# Patient Record
Sex: FEMALE | Race: BLACK OR AFRICAN AMERICAN | Employment: FULL TIME | URBAN - METROPOLITAN AREA
[De-identification: names, ages, dates, MRNs, and addresses within clinical notes are randomized per-mention and may not be internally consistent; named-entity substitution may affect disease eponyms.]

---

## 2017-10-27 ENCOUNTER — APPOINTMENT (OUTPATIENT)
Dept: ULTRASOUND IMAGING | Age: 22
End: 2017-10-27
Attending: EMERGENCY MEDICINE
Payer: COMMERCIAL

## 2017-10-27 ENCOUNTER — HOSPITAL ENCOUNTER (EMERGENCY)
Age: 22
Discharge: HOME | End: 2017-10-27
Attending: EMERGENCY MEDICINE
Payer: COMMERCIAL

## 2017-10-27 VITALS
OXYGEN SATURATION: 98 % | RESPIRATION RATE: 16 BRPM | SYSTOLIC BLOOD PRESSURE: 119 MMHG | HEART RATE: 70 BPM | DIASTOLIC BLOOD PRESSURE: 76 MMHG | WEIGHT: 200 LBS | HEIGHT: 66 IN | TEMPERATURE: 98.6 F | BODY MASS INDEX: 32.14 KG/M2

## 2017-10-27 DIAGNOSIS — R10.31 ABDOMINAL PAIN, RIGHT LOWER QUADRANT: Primary | ICD-10-CM

## 2017-10-27 LAB
ALBUMIN SERPL-MCNC: 3.9 G/DL (ref 3.5–5)
ALBUMIN/GLOB SERPL: 0.9 {RATIO} (ref 1.2–3.5)
ALP SERPL-CCNC: 55 U/L (ref 50–136)
ALT SERPL-CCNC: 27 U/L (ref 12–65)
ANION GAP SERPL CALC-SCNC: 8 MMOL/L (ref 7–16)
AST SERPL-CCNC: 23 U/L (ref 15–37)
BASOPHILS # BLD: 0 K/UL (ref 0–0.2)
BASOPHILS NFR BLD: 0 % (ref 0–2)
BILIRUB SERPL-MCNC: 0.2 MG/DL (ref 0.2–1.1)
BUN SERPL-MCNC: 10 MG/DL (ref 6–23)
CALCIUM SERPL-MCNC: 9 MG/DL (ref 8.3–10.4)
CHLORIDE SERPL-SCNC: 103 MMOL/L (ref 98–107)
CO2 SERPL-SCNC: 29 MMOL/L (ref 21–32)
CREAT SERPL-MCNC: 1.03 MG/DL (ref 0.6–1)
DIFFERENTIAL METHOD BLD: ABNORMAL
EOSINOPHIL # BLD: 0.4 K/UL (ref 0–0.8)
EOSINOPHIL NFR BLD: 5 % (ref 0.5–7.8)
ERYTHROCYTE [DISTWIDTH] IN BLOOD BY AUTOMATED COUNT: 14.1 % (ref 11.9–14.6)
GLOBULIN SER CALC-MCNC: 4.4 G/DL (ref 2.3–3.5)
GLUCOSE SERPL-MCNC: 95 MG/DL (ref 65–100)
HCG UR QL: NEGATIVE
HCT VFR BLD AUTO: 40 % (ref 35.8–46.3)
HGB BLD-MCNC: 13.2 G/DL (ref 11.7–15.4)
IMM GRANULOCYTES # BLD: 0 K/UL (ref 0–0.5)
IMM GRANULOCYTES NFR BLD: 0 % (ref 0–5)
LYMPHOCYTES # BLD: 3.1 K/UL (ref 0.5–4.6)
LYMPHOCYTES NFR BLD: 35 % (ref 13–44)
MCH RBC QN AUTO: 26.7 PG (ref 26.1–32.9)
MCHC RBC AUTO-ENTMCNC: 33 G/DL (ref 31.4–35)
MCV RBC AUTO: 81 FL (ref 79.6–97.8)
MONOCYTES # BLD: 0.6 K/UL (ref 0.1–1.3)
MONOCYTES NFR BLD: 7 % (ref 4–12)
NEUTS SEG # BLD: 4.6 K/UL (ref 1.7–8.2)
NEUTS SEG NFR BLD: 53 % (ref 43–78)
PLATELET # BLD AUTO: 370 K/UL (ref 150–450)
PMV BLD AUTO: 9.5 FL (ref 10.8–14.1)
POTASSIUM SERPL-SCNC: 3.8 MMOL/L (ref 3.5–5.1)
PROT SERPL-MCNC: 8.3 G/DL (ref 6.3–8.2)
RBC # BLD AUTO: 4.94 M/UL (ref 4.05–5.25)
SERVICE CMNT-IMP: NORMAL
SODIUM SERPL-SCNC: 140 MMOL/L (ref 136–145)
WBC # BLD AUTO: 8.8 K/UL (ref 4.5–13.5)
WET PREP GENITAL: NORMAL

## 2017-10-27 PROCEDURE — 96361 HYDRATE IV INFUSION ADD-ON: CPT | Performed by: EMERGENCY MEDICINE

## 2017-10-27 PROCEDURE — 81003 URINALYSIS AUTO W/O SCOPE: CPT | Performed by: EMERGENCY MEDICINE

## 2017-10-27 PROCEDURE — 76856 US EXAM PELVIC COMPLETE: CPT

## 2017-10-27 PROCEDURE — 81025 URINE PREGNANCY TEST: CPT

## 2017-10-27 PROCEDURE — 80053 COMPREHEN METABOLIC PANEL: CPT | Performed by: EMERGENCY MEDICINE

## 2017-10-27 PROCEDURE — 96375 TX/PRO/DX INJ NEW DRUG ADDON: CPT | Performed by: EMERGENCY MEDICINE

## 2017-10-27 PROCEDURE — 74011250636 HC RX REV CODE- 250/636: Performed by: EMERGENCY MEDICINE

## 2017-10-27 PROCEDURE — 87210 SMEAR WET MOUNT SALINE/INK: CPT | Performed by: EMERGENCY MEDICINE

## 2017-10-27 PROCEDURE — 85025 COMPLETE CBC W/AUTO DIFF WBC: CPT | Performed by: EMERGENCY MEDICINE

## 2017-10-27 PROCEDURE — 96374 THER/PROPH/DIAG INJ IV PUSH: CPT | Performed by: EMERGENCY MEDICINE

## 2017-10-27 PROCEDURE — 99285 EMERGENCY DEPT VISIT HI MDM: CPT | Performed by: EMERGENCY MEDICINE

## 2017-10-27 RX ORDER — BUPROPION HYDROCHLORIDE 150 MG/1
150 TABLET ORAL
COMMUNITY

## 2017-10-27 RX ORDER — ONDANSETRON 2 MG/ML
4 INJECTION INTRAMUSCULAR; INTRAVENOUS
Status: COMPLETED | OUTPATIENT
Start: 2017-10-27 | End: 2017-10-27

## 2017-10-27 RX ORDER — MORPHINE SULFATE 10 MG/ML
5 INJECTION, SOLUTION INTRAMUSCULAR; INTRAVENOUS ONCE
Status: COMPLETED | OUTPATIENT
Start: 2017-10-27 | End: 2017-10-27

## 2017-10-27 RX ORDER — METRONIDAZOLE 7.5 MG/G
1 GEL VAGINAL
Qty: 187.5 MG | Refills: 0 | Status: SHIPPED | OUTPATIENT
Start: 2017-10-27 | End: 2017-11-01

## 2017-10-27 RX ORDER — TRAMADOL HYDROCHLORIDE 50 MG/1
50 TABLET ORAL
Qty: 12 TAB | Refills: 0 | Status: SHIPPED | OUTPATIENT
Start: 2017-10-27 | End: 2017-11-08

## 2017-10-27 RX ADMIN — ONDANSETRON 4 MG: 2 INJECTION INTRAMUSCULAR; INTRAVENOUS at 18:06

## 2017-10-27 RX ADMIN — SODIUM CHLORIDE 1000 ML: 900 INJECTION, SOLUTION INTRAVENOUS at 18:06

## 2017-10-27 RX ADMIN — MORPHINE SULFATE 5 MG: 10 INJECTION INTRAMUSCULAR; INTRAVENOUS; SUBCUTANEOUS at 18:06

## 2017-10-27 NOTE — ED PROVIDER NOTES
HPI:  23 F, history of right ovarian cystocele with pain in the right lower quadrant with abnormal vaginal bleeding that is heavy for the past 4 hours. Similar to previous ovarian cyst pain. Recently moved here 2-1/2 months ago from [place]. Since then has had 3 episode abnormal vaginal bleeding. Has not been sexually active for over one year. Currently on Sprintec. Has an appointment with [facility]. Call them about her heavy vaginal bleeding today and was told to come to the emergency room for evaluation. No Fever. Positive nausea. No vomiting. No urinary symptoms. No weakness tingling or numbness. ROS  Constitutional: No fever, no chills  Skin: no rash  Eye: No vision changes  ENMT: No sore throat, no congestion  Respiratory: No shortness of breath, no cough  Cardiovascular: No chest pain, no palpitations  Gastrointestinal: No vomiting, + nausea, no diarrhea, no constipation, no rectal bleeding, + abdominal pain  : No dysuria, no hematuria  MSK: No back pain, no muscle pain, no joint pain  Neuro: No headache, no change in mental status, no numbness, no tingling, no weakness  Psych: No anxiety, no depression  Endocrine: No hyperglycemia  All other review of systems positive per history of present illness and the above otherwise negative or noncontributory. Visit Vitals    /83 (BP 1 Location: Left arm, BP Patient Position: Sitting)    Pulse 80    Temp 97.9 °F (36.6 °C)    Resp 16    Ht 5' 6\" (1.676 m)    Wt 90.7 kg (200 lb)    LMP 10/17/2017    SpO2 100%    BMI 32.28 kg/m2     Past Medical History:   Diagnosis Date    Depression     Fx     Fx both wrist x6 times, Right ankle x2, Ribs- Patient was a .  Ovarian cyst     Right and left ovary     History reviewed. No pertinent surgical history. Prior to Admission Medications   Prescriptions Last Dose Informant Patient Reported? Taking?    albuterol (PROVENTIL HFA, VENTOLIN HFA, PROAIR HFA) 90 mcg/actuation inhaler   Yes No   Sig: Take 2 Puffs by inhalation. buPROPion XL (WELLBUTRIN XL) 150 mg tablet   Yes Yes   Sig: Take 150 mg by mouth every morning. loratadine (CLARITIN) 10 mg tablet   Yes No   Sig: Take 10 mg by mouth. norgestimate-ethinyl estradiol (SPRINTEC, 28,) 0.25-35 mg-mcg tab   No No   Sig: Take 1 Tab by mouth daily. Facility-Administered Medications: None         Adult Exam   General: alert, no acute distress  Head: normocephalic, atraumatic  ENT: moist mucous membranes  Neck: supple, non-tender; full range of motion  Cardiovascular: regular rate and rhythm, normal peripheral perfusion, no edema  Respiratory: lungs are clear to auscultation; normal respirations; no wheezing, rales or rhonchi  Gastrointestinal: soft, +RLQ pain; no rebound or guarding, no peritoneal signs, no distension  : dry blood noted externally without any obvious lesion. Small amount of blood noted within the vaginal vault. Dark red. No cervical motion tenderness. Left adnexa without pain or mass. Right adnexal mass with tenderness to palpation. Back: non-tender, full range of motion  Musculoskeletal: normal range of motion, normal strength, no gross deformities  Neurological: alert and oriented x 4, no gross focal deficits; normal speech  Psychiatric: cooperative; appropriate mood and affect    MDM:inserted for dysfunctional uterine bleed, ruptured ovarian cyst.  She does not have any signs of peritonitis at this time. We'll obtain ultrasound for evaluation of ovarian cyst, torsion. Lower suspicion for acute appendicitis at this time. Us Pelv Non Ob W Tv    Result Date: 10/27/2017  Pelvic and transvaginal ultrasound History right lower quadrant pelvic pain x7 hours. Dysfunctional uterine bleeding. Real-time pelvic and transvaginal ultrasounds performed. No prior studies are available for comparison. FINDINGS: The uterus measures approximately 5.4 x 2.5 x 3.0 cm.  The endometrial complex was unable to be visualized. The right ovary was measured at approximately 4.0 x 1.4 x 3.0 cm. The left ovary was measured at approximately 3.2 x 1.8 x 2.0 cm. Transvaginal imaging is recommended to further assess the endometrial complex and adnexa. Transvaginal ultrasound: The uterus is homogeneous without discrete mass. The individual complex measures 2 mm. There is a trace amount of free fluid within the endocervical canal. The right ovary was remeasured at approximately 3.4 x 1.8 x 1.9 cm containing several small follicles. The left ovary was remeasured at approximately 2.9 x 2.3 x 2.5 cm also containing several small follicles. Color flow is present within both ovaries. There is no free fluid within the pelvis. IMPRESSION: 1. Trace amount of fluid within the endocervical canal is nonspecific. Correlation with menstrual cycle is recommended. 2. Otherwise unremarkable pelvic and transvaginal ultrasound. Recent Results (from the past 8 hour(s))   CBC WITH AUTOMATED DIFF    Collection Time: 10/27/17  5:20 PM   Result Value Ref Range    WBC 8.8 4.5 - 13.5 K/uL    RBC 4.94 4.05 - 5.25 M/uL    HGB 13.2 11.7 - 15.4 g/dL    HCT 40.0 35.8 - 46.3 %    MCV 81.0 79.6 - 97.8 FL    MCH 26.7 26.1 - 32.9 PG    MCHC 33.0 31.4 - 35.0 g/dL    RDW 14.1 11.9 - 14.6 %    PLATELET 727 851 - 288 K/uL    MPV 9.5 (L) 10.8 - 14.1 FL    DF AUTOMATED      NEUTROPHILS 53 43 - 78 %    LYMPHOCYTES 35 13 - 44 %    MONOCYTES 7 4.0 - 12.0 %    EOSINOPHILS 5 0.5 - 7.8 %    BASOPHILS 0 0.0 - 2.0 %    IMMATURE GRANULOCYTES 0 0.0 - 5.0 %    ABS. NEUTROPHILS 4.6 1.7 - 8.2 K/UL    ABS. LYMPHOCYTES 3.1 0.5 - 4.6 K/UL    ABS. MONOCYTES 0.6 0.1 - 1.3 K/UL    ABS. EOSINOPHILS 0.4 0.0 - 0.8 K/UL    ABS. BASOPHILS 0.0 0.0 - 0.2 K/UL    ABS. IMM.  GRANS. 0.0 0.0 - 0.5 K/UL   METABOLIC PANEL, COMPREHENSIVE    Collection Time: 10/27/17  5:20 PM   Result Value Ref Range    Sodium 140 136 - 145 mmol/L    Potassium 3.8 3.5 - 5.1 mmol/L    Chloride 103 98 - 107 mmol/L CO2 29 21 - 32 mmol/L    Anion gap 8 7 - 16 mmol/L    Glucose 95 65 - 100 mg/dL    BUN 10 6 - 23 MG/DL    Creatinine 1.03 (H) 0.6 - 1.0 MG/DL    GFR est AA >60 >60 ml/min/1.73m2    GFR est non-AA >60 >60 ml/min/1.73m2    Calcium 9.0 8.3 - 10.4 MG/DL    Bilirubin, total 0.2 0.2 - 1.1 MG/DL    ALT (SGPT) 27 12 - 65 U/L    AST (SGOT) 23 15 - 37 U/L    Alk. phosphatase 55 50 - 136 U/L    Protein, total 8.3 (H) 6.3 - 8.2 g/dL    Albumin 3.9 3.5 - 5.0 g/dL    Globulin 4.4 (H) 2.3 - 3.5 g/dL    A-G Ratio 0.9 (L) 1.2 - 3.5     HCG URINE, QL. - POC    Collection Time: 10/27/17  5:24 PM   Result Value Ref Range    Pregnancy test,urine (POC) NEGATIVE  NEG     WET PREP    Collection Time: 10/27/17  6:06 PM   Result Value Ref Range    Special Requests: NO SPECIAL REQUESTS      Wet prep 0 TO 5 WBC PER HPF SEEN     Wet prep CLUE CELLS PRESENT      Wet prep NO TRICHOMONAS SEEN      Wet prep NO YEAST SEEN       Labs unremarkable. Ultrasound unremarkable. No signs of torsion. Not anemic. We'll recommend discharge home at this time with Ultram, Midol as needed for pain. Wet prep with clue cells present however she does not have any abnormal discharge. Asymptomatic. WIll give script for Metronidazole cream if she were to develop symptoms       Dragon voice recognition software was used to create this note. Although the note has been reviewed and corrected where necessary, additional errors may have been overlooked and remain in the text.

## 2017-10-27 NOTE — ED TRIAGE NOTES
Patient with h/o ovarian cysts, today with RLQ pain and heavy vaginal bleeding x 4 hours. OBGYN - [facility]. Patient states she called her GYN office and was told to come to the ER due to the amount of bleeding.

## 2017-10-28 NOTE — DISCHARGE INSTRUCTIONS
Abdominal Pain: Care Instructions  Your Care Instructions    Abdominal pain has many possible causes. Some aren't serious and get better on their own in a few days. Others need more testing and treatment. If your pain continues or gets worse, you need to be rechecked and may need more tests to find out what is wrong. You may need surgery to correct the problem. Don't ignore new symptoms, such as fever, nausea and vomiting, urination problems, pain that gets worse, and dizziness. These may be signs of a more serious problem. Your doctor may have recommended a follow-up visit in the next 8 to 12 hours. If you are not getting better, you may need more tests or treatment. The doctor has checked you carefully, but problems can develop later. If you notice any problems or new symptoms, get medical treatment right away. Follow-up care is a key part of your treatment and safety. Be sure to make and go to all appointments, and call your doctor if you are having problems. It's also a good idea to know your test results and keep a list of the medicines you take. How can you care for yourself at home? · Rest until you feel better. · To prevent dehydration, drink plenty of fluids, enough so that your urine is light yellow or clear like water. Choose water and other caffeine-free clear liquids until you feel better. If you have kidney, heart, or liver disease and have to limit fluids, talk with your doctor before you increase the amount of fluids you drink. · If your stomach is upset, eat mild foods, such as rice, dry toast or crackers, bananas, and applesauce. Try eating several small meals instead of two or three large ones. · Wait until 48 hours after all symptoms have gone away before you have spicy foods, alcohol, and drinks that contain caffeine. · Do not eat foods that are high in fat. · Avoid anti-inflammatory medicines such as aspirin, ibuprofen (Advil, Motrin), and naproxen (Aleve).  These can cause stomach upset. Talk to your doctor if you take daily aspirin for another health problem. When should you call for help? Call 911 anytime you think you may need emergency care. For example, call if:  ? · You passed out (lost consciousness). ? · You pass maroon or very bloody stools. ? · You vomit blood or what looks like coffee grounds. ? · You have new, severe belly pain. ?Call your doctor now or seek immediate medical care if:  ? · Your pain gets worse, especially if it becomes focused in one area of your belly. ? · You have a new or higher fever. ? · Your stools are black and look like tar, or they have streaks of blood. ? · You have unexpected vaginal bleeding. ? · You have symptoms of a urinary tract infection. These may include:  ¨ Pain when you urinate. ¨ Urinating more often than usual.  ¨ Blood in your urine. ? · You are dizzy or lightheaded, or you feel like you may faint. ? Watch closely for changes in your health, and be sure to contact your doctor if:  ? · You are not getting better after 1 day (24 hours). Where can you learn more? Go to http://gina-brielle.info/. Enter M440 in the search box to learn more about \"Abdominal Pain: Care Instructions. \"  Current as of: March 20, 2017  Content Version: 11.4  © 3456-5251 [employer]. Care instructions adapted under license by [employer] (which disclaims liability or warranty for this information). If you have questions about a medical condition or this instruction, always ask your healthcare professional. [place] [address] any warranty or liability for your use of this information.

## 2017-10-28 NOTE — ED NOTES
I have reviewed discharge instructions with the patient. The patient verbalized understanding. Patient left ED via Discharge Method: ambulatory to Home with family/friend,      Opportunity for questions and clarification provided. Patient given 2 scripts.

## 2017-11-08 PROBLEM — R10.2 PELVIC PAIN: Status: ACTIVE | Noted: 2017-11-08

## 2020-03-04 ENCOUNTER — HOSPITAL ENCOUNTER (OUTPATIENT)
Dept: ULTRASOUND IMAGING | Age: 25
Discharge: HOME | End: 2020-03-04
Attending: NURSE PRACTITIONER
Payer: COMMERCIAL

## 2020-03-04 DIAGNOSIS — N20.0 KIDNEY STONES: ICD-10-CM

## 2020-03-04 PROCEDURE — 76770 US EXAM ABDO BACK WALL COMP: CPT

## 2020-03-04 NOTE — PROGRESS NOTES
Reviewed. No hydronephrosis. Has stone in the upper pole of the left kidney. Shows thickened bladder wall, which is expected with recent infection. Will go over results with pt at appt 3/11.

## 2022-03-18 PROBLEM — R10.2 PELVIC PAIN: Status: ACTIVE | Noted: 2017-11-08

## 2022-06-29 PROCEDURE — 96375 TX/PRO/DX INJ NEW DRUG ADDON: CPT

## 2022-06-29 PROCEDURE — 96374 THER/PROPH/DIAG INJ IV PUSH: CPT

## 2022-06-29 PROCEDURE — 99284 EMERGENCY DEPT VISIT MOD MDM: CPT

## 2022-06-30 ENCOUNTER — HOSPITAL ENCOUNTER (EMERGENCY)
Dept: CT IMAGING | Age: 27
Discharge: HOME | End: 2022-07-03
Payer: COMMERCIAL

## 2022-06-30 ENCOUNTER — HOSPITAL ENCOUNTER (EMERGENCY)
Age: 27
Discharge: HOME | End: 2022-06-30
Attending: EMERGENCY MEDICINE
Payer: COMMERCIAL

## 2022-06-30 VITALS
BODY MASS INDEX: 40.18 KG/M2 | SYSTOLIC BLOOD PRESSURE: 137 MMHG | HEIGHT: 66 IN | OXYGEN SATURATION: 100 % | WEIGHT: 250 LBS | RESPIRATION RATE: 20 BRPM | HEART RATE: 102 BPM | TEMPERATURE: 98.3 F | DIASTOLIC BLOOD PRESSURE: 90 MMHG

## 2022-06-30 DIAGNOSIS — N23 RENAL COLIC: Primary | ICD-10-CM

## 2022-06-30 DIAGNOSIS — N20.1 RIGHT URETERAL CALCULUS: ICD-10-CM

## 2022-06-30 LAB
ALBUMIN SERPL-MCNC: 3.7 G/DL (ref 3.5–5)
ALBUMIN/GLOB SERPL: 0.9 {RATIO} (ref 1.2–3.5)
ALP SERPL-CCNC: 57 U/L (ref 50–136)
ALT SERPL-CCNC: 45 U/L (ref 12–65)
ANION GAP SERPL CALC-SCNC: 7 MMOL/L (ref 7–16)
AST SERPL-CCNC: 28 U/L (ref 15–37)
BACTERIA URNS QL MICRO: ABNORMAL /HPF
BASOPHILS # BLD: 0 K/UL (ref 0–0.2)
BASOPHILS NFR BLD: 0 % (ref 0–2)
BILIRUB SERPL-MCNC: <0.1 MG/DL (ref 0.2–1.1)
BUN SERPL-MCNC: 14 MG/DL (ref 6–23)
CALCIUM SERPL-MCNC: 9.1 MG/DL (ref 8.3–10.4)
CASTS URNS QL MICRO: 0 /LPF
CHLORIDE SERPL-SCNC: 109 MMOL/L (ref 98–107)
CO2 SERPL-SCNC: 24 MMOL/L (ref 21–32)
CREAT SERPL-MCNC: 1.4 MG/DL (ref 0.6–1)
CRYSTALS URNS QL MICRO: 0 /LPF
DIFFERENTIAL METHOD BLD: ABNORMAL
EOSINOPHIL # BLD: 0.1 K/UL (ref 0–0.8)
EOSINOPHIL NFR BLD: 1 % (ref 0.5–7.8)
EPI CELLS #/AREA URNS HPF: ABNORMAL /HPF
ERYTHROCYTE [DISTWIDTH] IN BLOOD BY AUTOMATED COUNT: 15.5 % (ref 11.9–14.6)
GLOBULIN SER CALC-MCNC: 3.9 G/DL (ref 2.3–3.5)
GLUCOSE SERPL-MCNC: 111 MG/DL (ref 65–100)
HCG UR QL: NEGATIVE
HCT VFR BLD AUTO: 39.4 % (ref 35.8–46.3)
HGB BLD-MCNC: 12.7 G/DL (ref 11.7–15.4)
IMM GRANULOCYTES # BLD AUTO: 0 K/UL (ref 0–0.5)
IMM GRANULOCYTES NFR BLD AUTO: 0 % (ref 0–5)
LIPASE SERPL-CCNC: 208 U/L (ref 73–393)
LYMPHOCYTES # BLD: 3.7 K/UL (ref 0.5–4.6)
LYMPHOCYTES NFR BLD: 40 % (ref 13–44)
MCH RBC QN AUTO: 25.6 PG (ref 26.1–32.9)
MCHC RBC AUTO-ENTMCNC: 32.2 G/DL (ref 31.4–35)
MCV RBC AUTO: 79.3 FL (ref 79.6–97.8)
MONOCYTES # BLD: 1 K/UL (ref 0.1–1.3)
MONOCYTES NFR BLD: 11 % (ref 4–12)
MUCOUS THREADS URNS QL MICRO: ABNORMAL /LPF
NEUTS SEG # BLD: 4.3 K/UL (ref 1.7–8.2)
NEUTS SEG NFR BLD: 47 % (ref 43–78)
NRBC # BLD: 0 K/UL (ref 0–0.2)
OTHER OBSERVATIONS: ABNORMAL
PLATELET # BLD AUTO: 420 K/UL (ref 150–450)
PMV BLD AUTO: 9.1 FL (ref 9.4–12.3)
POTASSIUM SERPL-SCNC: 4.5 MMOL/L (ref 3.5–5.1)
PROT SERPL-MCNC: 7.6 G/DL (ref 6.3–8.2)
RBC # BLD AUTO: 4.97 M/UL (ref 4.05–5.2)
RBC #/AREA URNS HPF: ABNORMAL /HPF
SODIUM SERPL-SCNC: 140 MMOL/L (ref 136–145)
WBC # BLD AUTO: 9.1 K/UL (ref 4.3–11.1)
WBC URNS QL MICRO: ABNORMAL /HPF

## 2022-06-30 PROCEDURE — 80053 COMPREHEN METABOLIC PANEL: CPT

## 2022-06-30 PROCEDURE — 74176 CT ABD & PELVIS W/O CONTRAST: CPT

## 2022-06-30 PROCEDURE — 81015 MICROSCOPIC EXAM OF URINE: CPT

## 2022-06-30 PROCEDURE — 83690 ASSAY OF LIPASE: CPT

## 2022-06-30 PROCEDURE — 81025 URINE PREGNANCY TEST: CPT

## 2022-06-30 PROCEDURE — 6370000000 HC RX 637 (ALT 250 FOR IP): Performed by: EMERGENCY MEDICINE

## 2022-06-30 PROCEDURE — 85025 COMPLETE CBC W/AUTO DIFF WBC: CPT

## 2022-06-30 PROCEDURE — 6360000002 HC RX W HCPCS: Performed by: EMERGENCY MEDICINE

## 2022-06-30 RX ORDER — KETOROLAC TROMETHAMINE 10 MG/1
10 TABLET, FILM COATED ORAL EVERY 6 HOURS PRN
Qty: 20 TABLET | Refills: 0 | Status: SHIPPED | OUTPATIENT
Start: 2022-06-30 | End: 2022-07-05

## 2022-06-30 RX ORDER — KETOROLAC TROMETHAMINE 30 MG/ML
30 INJECTION, SOLUTION INTRAMUSCULAR; INTRAVENOUS
Status: COMPLETED | OUTPATIENT
Start: 2022-06-30 | End: 2022-06-30

## 2022-06-30 RX ORDER — TAMSULOSIN HYDROCHLORIDE 0.4 MG/1
0.4 CAPSULE ORAL
Status: COMPLETED | OUTPATIENT
Start: 2022-06-30 | End: 2022-06-30

## 2022-06-30 RX ORDER — TAMSULOSIN HYDROCHLORIDE 0.4 MG/1
0.4 CAPSULE ORAL DAILY
Status: DISCONTINUED | OUTPATIENT
Start: 2022-06-30 | End: 2022-06-30

## 2022-06-30 RX ORDER — SODIUM CHLORIDE 0.9 % (FLUSH) 0.9 %
3 SYRINGE (ML) INJECTION EVERY 8 HOURS
Status: DISCONTINUED | OUTPATIENT
Start: 2022-06-30 | End: 2022-06-30 | Stop reason: HOSPADM

## 2022-06-30 RX ORDER — ONDANSETRON 2 MG/ML
4 INJECTION INTRAMUSCULAR; INTRAVENOUS
Status: COMPLETED | OUTPATIENT
Start: 2022-06-30 | End: 2022-06-30

## 2022-06-30 RX ORDER — TAMSULOSIN HYDROCHLORIDE 0.4 MG/1
0.4 CAPSULE ORAL DAILY
Qty: 10 CAPSULE | Refills: 0 | Status: SHIPPED | OUTPATIENT
Start: 2022-06-30

## 2022-06-30 RX ADMIN — TAMSULOSIN HYDROCHLORIDE 0.4 MG: 0.4 CAPSULE ORAL at 01:12

## 2022-06-30 RX ADMIN — ONDANSETRON 4 MG: 2 INJECTION INTRAMUSCULAR; INTRAVENOUS at 01:12

## 2022-06-30 RX ADMIN — KETOROLAC TROMETHAMINE 30 MG: 30 INJECTION, SOLUTION INTRAMUSCULAR at 01:13

## 2022-06-30 ASSESSMENT — PAIN SCALES - GENERAL
PAINLEVEL_OUTOF10: 8
PAINLEVEL_OUTOF10: 6

## 2022-06-30 ASSESSMENT — ENCOUNTER SYMPTOMS
DIARRHEA: 0
NAUSEA: 1
ABDOMINAL PAIN: 0
BLOOD IN STOOL: 0
BOWEL INCONTINENCE: 0
VOMITING: 0
BACK PAIN: 1

## 2022-06-30 ASSESSMENT — LIFESTYLE VARIABLES: HOW OFTEN DO YOU HAVE A DRINK CONTAINING ALCOHOL: NEVER

## 2022-06-30 ASSESSMENT — PAIN DESCRIPTION - ORIENTATION: ORIENTATION: RIGHT

## 2022-06-30 ASSESSMENT — PAIN DESCRIPTION - FREQUENCY: FREQUENCY: INTERMITTENT

## 2022-06-30 ASSESSMENT — PAIN DESCRIPTION - PAIN TYPE: TYPE: ACUTE PAIN

## 2022-06-30 ASSESSMENT — PAIN DESCRIPTION - LOCATION: LOCATION: FLANK

## 2022-06-30 ASSESSMENT — PAIN DESCRIPTION - DESCRIPTORS: DESCRIPTORS: DISCOMFORT

## 2022-06-30 ASSESSMENT — PAIN - FUNCTIONAL ASSESSMENT: PAIN_FUNCTIONAL_ASSESSMENT: 0-10

## 2022-06-30 NOTE — ED NOTES
I have reviewed discharge instructions with the patient. The patient verbalized understanding. Patient left ED via Discharge Method: ambulatory to Home with family/friend. Opportunity for questions and clarification provided. Patient given 2 scripts. To continue your aftercare when you leave the hospital, you may receive an automated call from our care team to check in on how you are doing. This is a free service and part of our promise to provide the best care and service to meet your aftercare needs.  If you have questions, or wish to unsubscribe from this service please call [phone]. Thank you for Choosing our [facility] Emergency Department. Montine Cree Woodward Brittle, RN  06/30/22 2202

## 2022-06-30 NOTE — ED PROVIDER NOTES
groin. Pain severity:  Severe  Pain is:  Same all the time  Onset quality:  Sudden  Duration:  5 hours  Progression:  Unchanged  Chronicity:  New  Context: not recent illness, not recent injury and not twisting    Relieved by:  Nothing  Worsened by:  Nothing  Ineffective treatments:  None tried  Associated symptoms: dysuria    Associated symptoms: no abdominal pain, no bladder incontinence, no bowel incontinence, no chest pain, no fever, no headaches, no leg pain, no numbness, no paresthesias, no pelvic pain, no perianal numbness, no tingling and no weakness    Risk factors comment:  History of kidney stones and infections        Review of Systems   Constitutional: Negative for chills and fever. Cardiovascular: Negative for chest pain. Gastrointestinal: Positive for nausea. Negative for abdominal pain, blood in stool, bowel incontinence, diarrhea and vomiting. Genitourinary: Positive for dysuria. Negative for bladder incontinence and pelvic pain. Musculoskeletal: Positive for back pain. Neurological: Negative for tingling, weakness, numbness, headaches and paresthesias. All other systems reviewed and are negative. Past Medical History:   Diagnosis Date    Asthma     Depression     Fracture     Fx both wrist x6 times, Right ankle x2, Ribs- Patient was a .     Menorrhagia with irregular cycle     Ovarian cyst     Right and left ovary        Past Surgical History:   Procedure Laterality Date    SHOULDER ARTHROSCOPY  03/17/2020    rotatorcuff repair        Family History   Adopted: Yes   Problem Relation Age of Onset    No Known Problems Paternal Grandfather     No Known Problems Paternal Grandmother     No Known Problems Maternal Grandfather     Breast Cancer Maternal Grandmother     Diabetes Mother         Birth mother    No Known Problems Father            Social Connections:     Frequency of Communication with Friends and Family: Not on file    Frequency of Social Gatherings with Friends and Family: Not on file    Attends Latter-day Services: Not on file    Active Member of Clubs or Organizations: Not on file    Attends Club or Organization Meetings: Not on file    Marital Status: Not on file        Allergies   Allergen Reactions    Coconut Fatty Acids Rash        Vitals signs and nursing note reviewed. Patient Vitals for the past 4 hrs:   Temp Pulse Resp BP   06/30/22 0006 98.3 °F (36.8 °C) (!) 102 20 129/78          Physical Exam  Vitals and nursing note reviewed. Constitutional:       General: She is not in acute distress. Appearance: Normal appearance. She is not ill-appearing, toxic-appearing or diaphoretic. HENT:      Head: Normocephalic and atraumatic. Eyes:      Extraocular Movements: Extraocular movements intact. Conjunctiva/sclera: Conjunctivae normal.      Pupils: Pupils are equal, round, and reactive to light. Cardiovascular:      Rate and Rhythm: Normal rate. Pulmonary:      Effort: Pulmonary effort is normal.   Abdominal:      General: Bowel sounds are normal. There is no distension. Palpations: Abdomen is soft. Tenderness: There is no abdominal tenderness. There is right CVA tenderness. There is no left CVA tenderness, guarding or rebound. Skin:     General: Skin is warm and dry. Neurological:      General: No focal deficit present. Mental Status: She is alert and oriented to person, place, and time. Mental status is at baseline. Psychiatric:         Mood and Affect: Mood normal.         Behavior: Behavior normal.         Thought Content:  Thought content normal.          Procedures      Labs Reviewed   CBC WITH AUTO DIFFERENTIAL - Abnormal; Notable for the following components:       Result Value    MCV 79.3 (*)     MCH 25.6 (*)     RDW 15.5 (*)     MPV 9.1 (*)     All other components within normal limits   COMPREHENSIVE METABOLIC PANEL   LIPASE   URINALYSIS, MICRO   PREGNANCY, URINE   POCT URINALYSIS DIPSTICK CT ABDOMEN PELVIS RENAL STONE Additional Contrast? None    (Results Pending)                          Voice dictation software was used during the making of this note. This software is not perfect and grammatical and other typographical errors may be present. This note has not been completely proofread for errors.      Winter Baker,   06/30/22 1560

## 2022-12-13 NOTE — TELEPHONE ENCOUNTER
Pt calling for refills of Aygestin. She is scheduled for her [facility] 1/11/23.

## 2023-01-10 NOTE — PROGRESS NOTES
Patient presents today for a routine gynecological examination with no complaints. OB History          1    Para        Term   0       0    AB   1    Living   0         SAB        IAB        Ectopic        Molar        Multiple        Live Births                      GYN History     Last pap: 23 Neg/Neg cxs      Patient's last menstrual period was 10/15/2022 (exact date). Cycle Length every 3 months Lasting 7  positive dysmenorrhea    Past Medical History:  Past Medical History:   Diagnosis Date    Asthma     Depression     Fracture     Fx both wrist x6 times, Right ankle x2, Ribs- Patient was a . Menorrhagia with irregular cycle     Ovarian cyst     Right and left ovary       Past Surgical History:  Past Surgical History:   Procedure Laterality Date    SHOULDER ARTHROSCOPY  2020    rotatorcuff repair       Allergies: Allergies   Allergen Reactions    Gluten Hives, Shortness Of Breath and Other (See Comments)     Migraines, primarily      Soy Headaches, Shortness Of Breath and Other (See Comments)     Other reaction(s): Unknown      Coconut Fatty Acids Rash       Medication History:  Current Outpatient Medications   Medication Sig Dispense Refill    traMADol (ULTRAM) 50 MG tablet Take 50 mg by mouth every 6 hours as needed for Pain.      tamsulosin (FLOMAX) 0.4 MG capsule Take 1 capsule by mouth daily 10 capsule 0    albuterol sulfate  (90 Base) MCG/ACT inhaler Inhale 2 puffs into the lungs      amphetamine-dextroamphetamine (ADDERALL XR) 30 MG extended release capsule Take 30 mg by mouth.       buPROPion (WELLBUTRIN XL) 150 MG extended release tablet Take 150 mg by mouth      cetirizine (ZYRTEC) 10 MG tablet Take by mouth      FLUoxetine HCl, PMDD, 10 MG TABS Take 10 mg by mouth daily      ondansetron (ZOFRAN-ODT) 4 MG disintegrating tablet DISSOLVE 1 TABLET IN MOUTH EVERY 6 HOURS AS NEEDED FOR NAUSEA FOR VOMITING      norethindrone (AYGESTIN) 5 MG tablet Take 1 tablet by mouth in the morning and at bedtime Take 1 tablet by mouth twice daily 60 tablet 11     No current facility-administered medications for this visit. Social History:  Social History     Socioeconomic History    Marital status: Single     Spouse name: Not on file    Number of children: Not on file    Years of education: Not on file    Highest education level: Not on file   Occupational History    Not on file   Tobacco Use    Smoking status: Never    Smokeless tobacco: Never   Substance and Sexual Activity    Alcohol use: Yes    Drug use: No    Sexual activity: Not Currently     Partners: Male     Birth control/protection: Pill   Other Topics Concern    Not on file   Social History Narrative    Not on file     Social Determinants of Health     Financial Resource Strain: Not on file   Food Insecurity: Not on file   Transportation Needs: Not on file   Physical Activity: Not on file   Stress: Not on file   Social Connections: Not on file   Intimate Partner Violence: Not on file   Housing Stability: Not on file       Family History:  Family History   Adopted: Yes   Problem Relation Age of Onset    No Known Problems Paternal Grandfather     No Known Problems Paternal Grandmother     No Known Problems Maternal Grandfather     Breast Cancer Maternal Grandmother     Diabetes Mother         Birth mother    No Known Problems Father        Review of Systems - General ROS: negative except for that discussed in HPI      ROS:  Feeling well. No dyspnea or chest pain on exertion. No abdominal pain, change in bowel habits, black or bloody stools. No urinary tract symptoms. No neurological complaints. Objective:   BP (!) 142/100 Comment: States checks BP @ home and is usually normal  Ht 5' 6\" (1.676 m)   Wt 255 lb 12.8 oz (116 kg)   LMP 10/15/2022 (Exact Date)   BMI 41.29 kg/m²   The patient appears well, alert, oriented x 3, in no distress. ENT normal.  Neck supple. No adenopathy or thyromegaly.    Lungs: clear, good air entry, no wheezes, rhonchi or rales. Heart:  S1 and S2 normal, no murmurs, regular rate and rhythm. Abdomen:  soft without tenderness, guarding, mass or organomegaly. Extremities show no edema, normal peripheral pulses. Neurological is normal, no focal findings. BREAST EXAM: breasts appear normal, no suspicious masses, no skin or nipple changes or axillary nodes, symmetric fibrous changes bilaterally    PELVIC EXAM: VULVA: normal appearing vulva with no masses, tenderness or lesions, VAGINA: normal appearing vagina with normal color and discharge, no lesions, CERVIX: normal appearing cervix without discharge or lesions, UTERUS: uterus is normal size, shape, consistency and nontender, ADNEXA: normal adnexa in size, nontender and no masses    Assessment/Plan:     1. Well woman exam    - AMB POC URINALYSIS DIP STICK AUTO W/O MICRO  - PAP IG, CT-NG-TV, rfx Aptima HPV ASCUS ([id number]); Future  - PAP IG, CT-NG-TV, rfx Aptima HPV ASCUS ([id number])    2. Screening examination for venereal disease    - PAP IG, CT-NG-TV, rfx Aptima HPV ASCUS ([id number]); Future  - PAP IG, CT-NG-TV, rfx Aptima HPV ASCUS ([id number])    3. Screening for genitourinary condition    - AMB POC URINALYSIS DIP STICK AUTO W/O MICRO     pap smear  Refill aygestin as working well for her  return annually or prn    Supervising physician is Dr. Kamryn Avery.

## 2023-01-11 ENCOUNTER — OFFICE VISIT (OUTPATIENT)
Dept: OBGYN CLINIC | Age: 28
End: 2023-01-11
Payer: COMMERCIAL

## 2023-01-11 VITALS
BODY MASS INDEX: 41.11 KG/M2 | WEIGHT: 255.8 LBS | HEIGHT: 66 IN | DIASTOLIC BLOOD PRESSURE: 100 MMHG | SYSTOLIC BLOOD PRESSURE: 142 MMHG

## 2023-01-11 DIAGNOSIS — Z11.3 SCREENING EXAMINATION FOR VENEREAL DISEASE: ICD-10-CM

## 2023-01-11 DIAGNOSIS — Z01.419 WELL WOMAN EXAM: Primary | ICD-10-CM

## 2023-01-11 DIAGNOSIS — Z13.89 SCREENING FOR GENITOURINARY CONDITION: ICD-10-CM

## 2023-01-11 PROCEDURE — G8484 FLU IMMUNIZE NO ADMIN: HCPCS | Performed by: NURSE PRACTITIONER

## 2023-01-11 PROCEDURE — 99395 PREV VISIT EST AGE 18-39: CPT | Performed by: NURSE PRACTITIONER

## 2023-01-11 RX ORDER — TRAMADOL HYDROCHLORIDE 50 MG/1
50 TABLET ORAL EVERY 6 HOURS PRN
COMMUNITY

## 2024-03-05 NOTE — TELEPHONE ENCOUNTER
Gyn patient called requesting refills on Aygestin 10 mg. Last [facility] was on 1/22/23. Spoke with the patient and she is scheduled for [employer] on 3/19/24 at 8:30 am.   Prescription for Aygestin 10 mg #30 with 0 refills sent to the [facility] in [place].   All questions were answered.

## 2024-03-19 ENCOUNTER — OFFICE VISIT (OUTPATIENT)
Dept: OBGYN CLINIC | Age: 29
End: 2024-03-19
Payer: COMMERCIAL

## 2024-03-19 VITALS
DIASTOLIC BLOOD PRESSURE: 90 MMHG | BODY MASS INDEX: 41.74 KG/M2 | SYSTOLIC BLOOD PRESSURE: 134 MMHG | WEIGHT: 259.7 LBS | HEIGHT: 66 IN

## 2024-03-19 DIAGNOSIS — Z12.4 PAP SMEAR FOR CERVICAL CANCER SCREENING: ICD-10-CM

## 2024-03-19 DIAGNOSIS — Z13.89 SCREENING FOR GENITOURINARY CONDITION: ICD-10-CM

## 2024-03-19 DIAGNOSIS — Z86.69 HX OF MIGRAINE HEADACHES: ICD-10-CM

## 2024-03-19 DIAGNOSIS — Z01.419 WELL WOMAN EXAM: Primary | ICD-10-CM

## 2024-03-19 PROCEDURE — 99395 PREV VISIT EST AGE 18-39: CPT | Performed by: NURSE PRACTITIONER

## 2024-03-19 SDOH — ECONOMIC STABILITY: HOUSING INSECURITY
IN THE LAST 12 MONTHS, WAS THERE A TIME WHEN YOU DID NOT HAVE A STEADY PLACE TO SLEEP OR SLEPT IN A SHELTER (INCLUDING NOW)?: NO

## 2024-03-19 SDOH — ECONOMIC STABILITY: FOOD INSECURITY: WITHIN THE PAST 12 MONTHS, THE FOOD YOU BOUGHT JUST DIDN'T LAST AND YOU DIDN'T HAVE MONEY TO GET MORE.: NEVER TRUE

## 2024-03-19 SDOH — ECONOMIC STABILITY: FOOD INSECURITY: WITHIN THE PAST 12 MONTHS, YOU WORRIED THAT YOUR FOOD WOULD RUN OUT BEFORE YOU GOT MONEY TO BUY MORE.: NEVER TRUE

## 2024-03-19 SDOH — ECONOMIC STABILITY: INCOME INSECURITY: HOW HARD IS IT FOR YOU TO PAY FOR THE VERY BASICS LIKE FOOD, HOUSING, MEDICAL CARE, AND HEATING?: NOT HARD AT ALL

## 2024-03-19 SDOH — ECONOMIC STABILITY: TRANSPORTATION INSECURITY
IN THE PAST 12 MONTHS, HAS LACK OF TRANSPORTATION KEPT YOU FROM MEETINGS, WORK, OR FROM GETTING THINGS NEEDED FOR DAILY LIVING?: NO

## 2024-03-19 NOTE — PROGRESS NOTES
Patient presents today for a routine gynecological examination. Admits to migraine headaches with menstrual cycles x past year. About 2 weeks ago pt had a migraines that caused her to pass out for about 10 seconds. Admits to seeing stars. Migraines normally start day prior to menses and lasts for 3-4 days while on cycle. Migraines with aura. Declines STD testing today.    OB History          1    Para        Term   0       0    AB   1    Living             SAB   1    IAB        Ectopic        Molar        Multiple        Live Births                  Last pap smear: 23 (Negative, gc/chl/trich neg- HPV not performed)  Last mammogram: NA  Last colonoscopy: NA  Last DEXA: NA    GYN History           Patient's last menstrual period was 2024 (approximate). Cycle Length 90 Lasting 7 positive dysmenorrhea    Past Medical History:  Past Medical History:   Diagnosis Date    ADHD     Anxiety     Asthma     Chronic kidney disease     Stage 3    Depression     Fracture     Fx both wrist x6 times, Right ankle x2, Ribs- Patient was a .    Kidney stone     Menorrhagia with irregular cycle     Ovarian cyst     Right and left ovary       Past Surgical History:  Past Surgical History:   Procedure Laterality Date    SHOULDER ARTHROSCOPY  2020    rotatorcuff repair       Allergies:   Allergies   Allergen Reactions    Gluten Hives, Shortness Of Breath and Other (See Comments)     Migraines, primarily      Soy Headaches, Shortness Of Breath and Other (See Comments)     Other reaction(s): Unknown      Coconut Fatty Acids Rash       Medication History:  Current Outpatient Medications   Medication Sig Dispense Refill    norethindrone (AYGESTIN) 5 MG tablet Take 2 tablets by mouth daily 60 tablet 11    traMADol (ULTRAM) 50 MG tablet Take 1 tablet by mouth every 6 hours as needed for Pain.      tamsulosin (FLOMAX) 0.4 MG capsule Take 1 capsule by mouth daily (Patient taking differently: Take 1

## 2024-03-25 LAB
COLLECTION METHOD: NORMAL
CYTOLOGIST CVX/VAG CYTO: NORMAL
CYTOLOGY CVX/VAG DOC THIN PREP: NORMAL
DATE OF LMP: NORMAL
HPV REFLEX: NORMAL
Lab: NORMAL
PAP SOURCE: NORMAL
PATH REPORT.FINAL DX SPEC: NORMAL
PREV TREATMENT: NORMAL
STAT OF ADQ CVX/VAG CYTO-IMP: NORMAL

## 2024-04-02 ASSESSMENT — ENCOUNTER SYMPTOMS
CHEST TIGHTNESS: 0
ABDOMINAL PAIN: 0
EYE PAIN: 0
SORE THROAT: 0

## 2024-04-02 NOTE — PROGRESS NOTES
[facility] NEUROLOGY  [address] [place] [address] [address], [address]  [place] [address]  Phone: [phone] Fax [phone]  SHAILA Meza        Patient: Sherri Zhou  Provider: SHAILA Meza    CC:   Chief Complaint   Patient presents with    New Patient    Migraine       Referring Provider: Ana Luisa Peacock, GRETTA-CNP  PCP: Lakisha Del Valle APRN - NP    History of Present Illness:     Sherri Zhou is a 28 y.o. female who presents with history of migraines.    Today, the patient arrived to the office ambulating independently and in unaccompanied for her visit. One recent fall down about 3 stairs. No known injury.    Headaches are located right occipital, behind right eye and bilateral jaws. Pain does not radiate. History of TMJ; grinding and clinching. They began around 6-8 years old; originated in stomach. More recently have come back in the past 2-3 years. The current frequency of headaches: about 2 per month.  Duration: 3-5 days.  Severity is 4-10. Quality of headaches are described as throbbing, pressure, pulsating.  Associated symptoms: nausea, vomiting, photo, phono, numbness and tingling (worsening-with headache), blurred vision.  Denies osmo. The headaches are exacerbated by stress, lights, loud sounds.   Factors that relieve the headaches are cooling cap, eye massager, quite dark room, sleep.  Most bothersome migraine symptoms nausea.     History of head trauma: History of 3 concussions; last one was 2020 and LOC reported with the most recent  -Sought medical attention for each     Family history of migraines: Adopted; birth sister has migraines    Last eye exam: Last year    Regular physical activity: Gym 4 times per week    Daily hydration: Approximately 0.5-1 gallon    On average, sleeping 4 hours per night. Attributes to working full time and currently in school.    Other medical history includes: anxiety, depression, ADHD, OCD, HTN, asthma, chronic kidney disease,

## 2024-04-03 ENCOUNTER — OFFICE VISIT (OUTPATIENT)
Dept: NEUROLOGY | Age: 29
End: 2024-04-03
Payer: COMMERCIAL

## 2024-04-03 VITALS
WEIGHT: 260 LBS | DIASTOLIC BLOOD PRESSURE: 109 MMHG | HEIGHT: 66 IN | BODY MASS INDEX: 41.78 KG/M2 | HEART RATE: 100 BPM | SYSTOLIC BLOOD PRESSURE: 157 MMHG

## 2024-04-03 DIAGNOSIS — R20.2 NUMBNESS AND TINGLING IN BOTH HANDS: ICD-10-CM

## 2024-04-03 DIAGNOSIS — Z91.81 HISTORY OF RECENT FALL: ICD-10-CM

## 2024-04-03 DIAGNOSIS — R25.1 TREMOR: ICD-10-CM

## 2024-04-03 DIAGNOSIS — J45.909 ASTHMA, UNSPECIFIED ASTHMA SEVERITY, UNSPECIFIED WHETHER COMPLICATED, UNSPECIFIED WHETHER PERSISTENT: ICD-10-CM

## 2024-04-03 DIAGNOSIS — R20.0 NUMBNESS AND TINGLING IN BOTH HANDS: ICD-10-CM

## 2024-04-03 DIAGNOSIS — F32.A DEPRESSION, UNSPECIFIED DEPRESSION TYPE: ICD-10-CM

## 2024-04-03 DIAGNOSIS — R55 EPISODE OF LOSS OF CONSCIOUSNESS: ICD-10-CM

## 2024-04-03 DIAGNOSIS — Z91.81 HISTORY OF FALLING: ICD-10-CM

## 2024-04-03 DIAGNOSIS — G43.E19 INTRACTABLE CHRONIC MIGRAINE WITH AURA AND WITHOUT STATUS MIGRAINOSUS: Primary | ICD-10-CM

## 2024-04-03 DIAGNOSIS — F41.9 ANXIETY: ICD-10-CM

## 2024-04-03 DIAGNOSIS — M54.2 NECK PAIN: ICD-10-CM

## 2024-04-03 DIAGNOSIS — H53.9 TRANSIENT VISION DISTURBANCE OF BOTH EYES: ICD-10-CM

## 2024-04-03 DIAGNOSIS — I10 HYPERTENSION, UNSPECIFIED TYPE: ICD-10-CM

## 2024-04-03 DIAGNOSIS — N18.9 CHRONIC KIDNEY DISEASE, UNSPECIFIED CKD STAGE: ICD-10-CM

## 2024-04-03 DIAGNOSIS — G43.909 EPISODIC MIGRAINE: ICD-10-CM

## 2024-04-03 PROCEDURE — 3080F DIAST BP >= 90 MM HG: CPT

## 2024-04-03 PROCEDURE — 99205 OFFICE O/P NEW HI 60 MIN: CPT

## 2024-04-03 PROCEDURE — 3077F SYST BP >= 140 MM HG: CPT

## 2024-04-03 RX ORDER — RIMEGEPANT SULFATE 75 MG/75MG
1 TABLET, ORALLY DISINTEGRATING ORAL EVERY OTHER DAY
Qty: 16 TABLET | Refills: 2 | Status: SHIPPED | OUTPATIENT
Start: 2024-04-03 | End: 2024-05-03

## 2024-04-03 RX ORDER — METHYLPREDNISOLONE 4 MG/1
TABLET ORAL
Qty: 1 KIT | Refills: 0 | Status: SHIPPED | OUTPATIENT
Start: 2024-04-03 | End: 2024-04-09

## 2024-04-03 ASSESSMENT — ENCOUNTER SYMPTOMS
PHOTOPHOBIA: 1
NAUSEA: 1
VOMITING: 1
CONSTIPATION: 1

## 2024-07-15 ENCOUNTER — TELEPHONE (OUTPATIENT)
Dept: NEUROLOGY | Age: 29
End: 2024-07-15

## 2025-02-06 RX ORDER — NORETHINDRONE 5 MG/1
10 TABLET ORAL DAILY
Qty: 180 TABLET | Refills: 3 | OUTPATIENT
Start: 2025-02-06

## 2025-05-05 RX ORDER — NORETHINDRONE 5 MG/1
10 TABLET ORAL DAILY
Qty: 180 TABLET | Refills: 3 | OUTPATIENT
Start: 2025-05-05

## 2025-05-12 RX ORDER — NORETHINDRONE 5 MG/1
10 TABLET ORAL DAILY
Qty: 60 TABLET | Refills: 0 | Status: SHIPPED | OUTPATIENT
Start: 2025-05-12

## 2025-05-12 NOTE — TELEPHONE ENCOUNTER
Gyn patient called requesting refills on Aygestin 10 mg.   WWE is scheduled for 6/4/25.   Last WWE was on 3/19/24.  Refills sent to the pharmacy.

## 2025-06-03 RX ORDER — NORETHINDRONE 5 MG/1
10 TABLET ORAL DAILY
Qty: 180 TABLET | Refills: 1 | OUTPATIENT
Start: 2025-06-03

## 2025-06-26 ENCOUNTER — HOSPITAL ENCOUNTER (EMERGENCY)
Age: 30
Discharge: LEFT BEFORE DISCHARGE | End: 2025-06-26
Attending: STUDENT IN AN ORGANIZED HEALTH CARE EDUCATION/TRAINING PROGRAM

## 2025-06-26 ENCOUNTER — APPOINTMENT (OUTPATIENT)
Dept: CT IMAGING | Age: 30
End: 2025-06-26

## 2025-06-26 VITALS
TEMPERATURE: 97.6 F | RESPIRATION RATE: 16 BRPM | DIASTOLIC BLOOD PRESSURE: 111 MMHG | HEIGHT: 67 IN | OXYGEN SATURATION: 95 % | BODY MASS INDEX: 40.81 KG/M2 | SYSTOLIC BLOOD PRESSURE: 145 MMHG | WEIGHT: 260 LBS | HEART RATE: 115 BPM

## 2025-06-26 DIAGNOSIS — L30.9 DERMATITIS OF EXTERNAL EAR: ICD-10-CM

## 2025-06-26 DIAGNOSIS — S00.83XA CONTUSION OF FACE, INITIAL ENCOUNTER: Primary | ICD-10-CM

## 2025-06-26 LAB
EKG ATRIAL RATE: 121 BPM
EKG DIAGNOSIS: NORMAL
EKG P AXIS: 80 DEGREES
EKG P-R INTERVAL: 130 MS
EKG Q-T INTERVAL: 330 MS
EKG QRS DURATION: 68 MS
EKG QTC CALCULATION (BAZETT): 468 MS
EKG R AXIS: 72 DEGREES
EKG T AXIS: 46 DEGREES
EKG VENTRICULAR RATE: 121 BPM

## 2025-06-26 PROCEDURE — 93010 ELECTROCARDIOGRAM REPORT: CPT | Performed by: INTERNAL MEDICINE

## 2025-06-26 PROCEDURE — 70486 CT MAXILLOFACIAL W/O DYE: CPT

## 2025-06-26 PROCEDURE — 99284 EMERGENCY DEPT VISIT MOD MDM: CPT

## 2025-06-26 PROCEDURE — 70450 CT HEAD/BRAIN W/O DYE: CPT

## 2025-06-26 PROCEDURE — 93005 ELECTROCARDIOGRAM TRACING: CPT | Performed by: STUDENT IN AN ORGANIZED HEALTH CARE EDUCATION/TRAINING PROGRAM

## 2025-06-26 ASSESSMENT — LIFESTYLE VARIABLES
HOW OFTEN DO YOU HAVE A DRINK CONTAINING ALCOHOL: NEVER
HOW MANY STANDARD DRINKS CONTAINING ALCOHOL DO YOU HAVE ON A TYPICAL DAY: PATIENT DOES NOT DRINK

## 2025-06-26 NOTE — DISCHARGE INSTRUCTIONS
Alternate Tylenol and Motrin as needed for discomfort.  Apply Aquaphor to the skin irritation to your ears bilaterally.  Follow-up with primary care physician as needed.  Return to the ER for worsening or worrisome symptoms.

## 2025-06-26 NOTE — ED TRIAGE NOTES
Ambulatory to triage.   Mechanical fall two days ago. Fell up the stairs. States has broken nose.   Started to feel pressure in head and went to PCP. [facility] PCP checked ears and reported blood in ears. Patient also reports blurry vision, headache, and brain fog. MD made aware.

## 2025-06-26 NOTE — ED PROVIDER NOTES
Emergency Department Provider Note       [facility] EMERGENCY DEPT   PCP: Lakisha Del Valle, GRETTA - [place]   Age: 30 y.o.   Sex: female     DISPOSITION Eloped - Left Before Treatment Complete 06/26/2025 02:21:25 PM    ICD-10-CM    1. Contusion of face, initial encounter  S00.83XA       2. Dermatitis of external ear  L30.9           Medical Decision Making     30-year-old female presents emergency department for evaluation after a fall that occurred 2 days ago.  States she was seen by her PCP who advised her to come to the ER.  Patient is concerned because states she was climbing up stairs, hit her nose.  Denies loss of consciousness or being on blood thinners.  Denies numbness or weakness in extremities.  Denies nausea or vomiting.  States her PCP was concerned because she had blood coming from her ears and sent to the ER.  On evaluation patient has local dermatitis of both tragus and helical mariposa bilaterally.  This does not wipe off.  There is an area of skin irritation with some dried blood noted.  Again tried to see if this wiped off and consistent with drainage in the ear which it did not and is not drainage from the ear.  TMs bilaterally without hemotympanum.  Both TMs are intact.  Patient with normal neurologic exam.  She is also concerned states that she had cartilage come out of her nose.  Detailed exam without any significant abnormalities.  There is area that patient had a nose ring, whether that contributed to her concern for cartilage coming out of her nose it is unclear.  No septal hematoma.  No active bleeding from the nose.  Scattered areas of scabbing within the nose but they are small and do not believe these represent any traumatic significant injury.  Patient reports a headache as well as complaints of facial swelling.  Patient was informed that she needed additional imaging so we will order that per request.  CT head maxillofacial bones ordered.  Patient is tachycardic, quite anxious on exam.  CT

## 2025-06-30 NOTE — PROGRESS NOTES
Patient presents today for a routine gynecological examination with no complaints.  Requesting refills on Aygestin 10 mg.   Last pap smear: 2024 Negative.     OB History          1    Para        Term   0       0    AB   1    Living             SAB   1    IAB        Ectopic        Molar        Multiple        Live Births                  GYN History         No LMP recorded (exact date). (Menstrual status: Chemically Induced).   negative dysmenorrhea; negative postcoital bleeding    Past Medical History:  Past Medical History:   Diagnosis Date    ADHD     Anxiety     Asthma     Chronic kidney disease     Stage 3    Depression     Fracture     Fx both wrist x6 times, Right ankle x2, Ribs- Patient was a .    Kidney stone     Menorrhagia with irregular cycle     Ovarian cyst     Right and left ovary       Past Surgical History:  Past Surgical History:   Procedure Laterality Date    SHOULDER ARTHROSCOPY  2020    rotatorcuff repair       Allergies:   Allergies   Allergen Reactions    Gluten Hives, Shortness Of Breath and Headaches    Soy Shortness Of Breath and Headaches           Coconut Fatty Acid Rash       Medication History:  Current Outpatient Medications   Medication Sig Dispense Refill    FLUoxetine (PROZAC) 40 MG capsule Take 1 capsule by mouth daily      norethindrone (AYGESTIN) 5 MG tablet Take 2 tablets by mouth daily 60 tablet 12    traMADol (ULTRAM) 50 MG tablet Take 1 tablet by mouth every 6 hours as needed for Pain.      tamsulosin (FLOMAX) 0.4 MG capsule Take 1 capsule by mouth daily 10 capsule 0    albuterol sulfate  (90 Base) MCG/ACT inhaler Inhale 2 puffs into the lungs      amphetamine-dextroamphetamine (ADDERALL XR) 30 MG extended release capsule Take 1 capsule by mouth.      cetirizine (ZYRTEC) 10 MG tablet Take by mouth      ondansetron (ZOFRAN-ODT) 4 MG disintegrating tablet DISSOLVE 1 TABLET IN MOUTH EVERY 6 HOURS AS NEEDED FOR NAUSEA FOR VOMITING

## 2025-07-02 ENCOUNTER — OFFICE VISIT (OUTPATIENT)
Dept: OBGYN CLINIC | Age: 30
End: 2025-07-02
Payer: COMMERCIAL

## 2025-07-02 VITALS
WEIGHT: 267.9 LBS | SYSTOLIC BLOOD PRESSURE: 136 MMHG | DIASTOLIC BLOOD PRESSURE: 84 MMHG | HEIGHT: 67 IN | BODY MASS INDEX: 42.05 KG/M2

## 2025-07-02 DIAGNOSIS — Z01.419 WELL WOMAN EXAM: Primary | ICD-10-CM

## 2025-07-02 DIAGNOSIS — Z12.4 SCREENING FOR CERVICAL CANCER: ICD-10-CM

## 2025-07-02 DIAGNOSIS — Z13.89 SCREENING FOR GENITOURINARY CONDITION: ICD-10-CM

## 2025-07-02 LAB
BILIRUBIN, URINE, POC: NEGATIVE
BLOOD URINE, POC: NORMAL
GLUCOSE URINE, POC: NEGATIVE
KETONES, URINE, POC: NEGATIVE
LEUKOCYTE ESTERASE, URINE, POC: NORMAL
NITRITE, URINE, POC: NEGATIVE
PH, URINE, POC: 6 (ref 4.6–8)
PROTEIN,URINE, POC: NORMAL
SPECIFIC GRAVITY, URINE, POC: 1.02 (ref 1–1.03)
URINALYSIS CLARITY, POC: CLEAR
URINALYSIS COLOR, POC: YELLOW
UROBILINOGEN, POC: NORMAL MG/DL

## 2025-07-02 PROCEDURE — 81002 URINALYSIS NONAUTO W/O SCOPE: CPT | Performed by: NURSE PRACTITIONER

## 2025-07-02 PROCEDURE — 99395 PREV VISIT EST AGE 18-39: CPT | Performed by: NURSE PRACTITIONER

## 2025-07-02 RX ORDER — NORETHINDRONE 5 MG/1
10 TABLET ORAL DAILY
Qty: 60 TABLET | Refills: 12 | Status: SHIPPED | OUTPATIENT
Start: 2025-07-02

## 2025-07-02 RX ORDER — LORAZEPAM 1 MG/1
1 TABLET ORAL EVERY 6 HOURS PRN
COMMUNITY
End: 2025-07-02

## 2025-07-02 RX ORDER — FLUOXETINE HYDROCHLORIDE 40 MG/1
40 CAPSULE ORAL DAILY
COMMUNITY
Start: 2025-04-27

## 2025-07-06 LAB
COLLECTION METHOD: NORMAL
CYTOLOGIST CVX/VAG CYTO: NORMAL
CYTOLOGY CVX/VAG DOC THIN PREP: NORMAL
HPV REFLEX: NORMAL
Lab: NORMAL
PAP SOURCE: NORMAL
PATH REPORT.FINAL DX SPEC: NORMAL
STAT OF ADQ CVX/VAG CYTO-IMP: NORMAL